# Patient Record
Sex: FEMALE | URBAN - METROPOLITAN AREA
[De-identification: names, ages, dates, MRNs, and addresses within clinical notes are randomized per-mention and may not be internally consistent; named-entity substitution may affect disease eponyms.]

---

## 2023-08-01 ENCOUNTER — TELEPHONE ENCOUNTER (OUTPATIENT)
Dept: URBAN - METROPOLITAN AREA CLINIC 96 | Facility: CLINIC | Age: 23
End: 2023-08-01

## 2023-08-01 ENCOUNTER — OFFICE VISIT (OUTPATIENT)
Dept: URBAN - METROPOLITAN AREA TELEHEALTH 2 | Facility: TELEHEALTH | Age: 23
End: 2023-08-01
Payer: SELF-PAY

## 2023-08-01 VITALS — HEIGHT: 71 IN | BODY MASS INDEX: 41.02 KG/M2 | WEIGHT: 293 LBS

## 2023-08-01 DIAGNOSIS — R10.84 GENERALIZED ABDOMINAL PAIN: ICD-10-CM

## 2023-08-01 DIAGNOSIS — E66.01 MORBID OBESITY: ICD-10-CM

## 2023-08-01 DIAGNOSIS — K44.9 HIATAL HERNIA: ICD-10-CM

## 2023-08-01 PROBLEM — 235595009: Status: ACTIVE | Noted: 2023-08-01

## 2023-08-01 PROBLEM — 238136002: Status: ACTIVE | Noted: 2023-08-01

## 2023-08-01 PROCEDURE — 99204 OFFICE O/P NEW MOD 45 MIN: CPT | Performed by: INTERNAL MEDICINE

## 2023-08-01 RX ORDER — OMEPRAZOLE 40 MG/1
1 CAPSULE 30 MINUTES BEFORE MORNING MEAL CAPSULE, DELAYED RELEASE ORAL ONCE A DAY
Qty: 30 | OUTPATIENT
Start: 2023-08-01

## 2023-08-01 RX ORDER — FAMOTIDINE 20 MG/1
1 TABLET TABLET, FILM COATED ORAL TWICE A DAY
Status: ACTIVE | COMMUNITY

## 2023-08-01 NOTE — HPI-TODAY'S VISIT:
having a lot of stomach problems they started 2021, abd pain, bloating. acid reflux.  denies any bloody or black stools. pain is all over, sometimes flank, "bethanie all over" went to see a GI doctor and they did EGD, was told she has a hiatal hernia,  this was in 2021 (dr. atkins). no other tests were done. been on famotidine for some time, when she takes it, it works. still w/ GB. never had any surgeries. was told to lose weight, but then gained it back. stopped going to Dr Atkins b/c insurance ended. now has insurance. has daily BMs, usually twice. denies constipation. no family history of IBD

## 2023-08-10 ENCOUNTER — OFFICE VISIT (OUTPATIENT)
Dept: URBAN - METROPOLITAN AREA CLINIC 95 | Facility: CLINIC | Age: 23
End: 2023-08-10

## 2023-08-31 ENCOUNTER — OFFICE VISIT (OUTPATIENT)
Dept: URBAN - METROPOLITAN AREA CLINIC 98 | Facility: CLINIC | Age: 23
End: 2023-08-31

## 2023-08-31 ENCOUNTER — DASHBOARD ENCOUNTERS (OUTPATIENT)
Age: 23
End: 2023-08-31

## 2023-08-31 RX ORDER — OMEPRAZOLE 40 MG/1
1 CAPSULE 30 MINUTES BEFORE MORNING MEAL CAPSULE, DELAYED RELEASE ORAL ONCE A DAY
Qty: 30 | OUTPATIENT

## 2023-08-31 RX ORDER — OMEPRAZOLE 40 MG/1
1 CAPSULE 30 MINUTES BEFORE MORNING MEAL CAPSULE, DELAYED RELEASE ORAL ONCE A DAY
Qty: 30 | Status: ACTIVE | COMMUNITY
Start: 2023-08-01

## 2023-08-31 RX ORDER — FAMOTIDINE 20 MG/1
1 TABLET TABLET, FILM COATED ORAL TWICE A DAY
Status: ACTIVE | COMMUNITY

## 2023-08-31 NOTE — HPI-TODAY'S VISIT:
Previously 8/1/2023 With Dr. Yousif, having a lot of stomach problems they started 2021, abd pain, bloating. acid reflux.  denies any bloody or black stools. pain is all over, sometimes flank, "bethanie all over" went to see a GI doctor and they did EGD, was told she has a hiatal hernia,  this was in 2021 (dr. atkins). no other tests were done. been on famotidine for some time, when she takes it, it works. still w/ GB. never had any surgeries. was told to lose weight, but then gained it back. stopped going to Dr Atkins b/c insurance ended. now has insurance. has daily BMs, usually twice. denies constipation. no family history of IBD . Today on 8/31/2023,